# Patient Record
Sex: MALE | Race: WHITE | NOT HISPANIC OR LATINO | URBAN - METROPOLITAN AREA
[De-identification: names, ages, dates, MRNs, and addresses within clinical notes are randomized per-mention and may not be internally consistent; named-entity substitution may affect disease eponyms.]

---

## 2019-01-07 VITALS
TEMPERATURE: 98 F | SYSTOLIC BLOOD PRESSURE: 128 MMHG | OXYGEN SATURATION: 97 % | HEART RATE: 95 BPM | WEIGHT: 223.11 LBS | DIASTOLIC BLOOD PRESSURE: 78 MMHG | RESPIRATION RATE: 18 BRPM

## 2019-01-07 LAB — LACTATE SERPL-SCNC: 0.9 MMOL/L — SIGNIFICANT CHANGE UP (ref 0.5–2)

## 2019-01-07 RX ORDER — SODIUM CHLORIDE 9 MG/ML
1000 INJECTION INTRAMUSCULAR; INTRAVENOUS; SUBCUTANEOUS ONCE
Qty: 0 | Refills: 0 | Status: COMPLETED | OUTPATIENT
Start: 2019-01-07 | End: 2019-01-07

## 2019-01-07 RX ORDER — KETOROLAC TROMETHAMINE 30 MG/ML
15 SYRINGE (ML) INJECTION ONCE
Qty: 0 | Refills: 0 | Status: DISCONTINUED | OUTPATIENT
Start: 2019-01-07 | End: 2019-01-07

## 2019-01-07 RX ORDER — PIPERACILLIN AND TAZOBACTAM 4; .5 G/20ML; G/20ML
3.38 INJECTION, POWDER, LYOPHILIZED, FOR SOLUTION INTRAVENOUS ONCE
Qty: 0 | Refills: 0 | Status: COMPLETED | OUTPATIENT
Start: 2019-01-07 | End: 2019-01-07

## 2019-01-07 RX ORDER — MORPHINE SULFATE 50 MG/1
4 CAPSULE, EXTENDED RELEASE ORAL ONCE
Qty: 0 | Refills: 0 | Status: DISCONTINUED | OUTPATIENT
Start: 2019-01-07 | End: 2019-01-07

## 2019-01-07 RX ADMIN — PIPERACILLIN AND TAZOBACTAM 200 GRAM(S): 4; .5 INJECTION, POWDER, LYOPHILIZED, FOR SOLUTION INTRAVENOUS at 21:44

## 2019-01-07 RX ADMIN — Medication 15 MILLIGRAM(S): at 21:46

## 2019-01-07 RX ADMIN — SODIUM CHLORIDE 2000 MILLILITER(S): 9 INJECTION INTRAMUSCULAR; INTRAVENOUS; SUBCUTANEOUS at 21:45

## 2019-01-07 RX ADMIN — SODIUM CHLORIDE 2000 MILLILITER(S): 9 INJECTION INTRAMUSCULAR; INTRAVENOUS; SUBCUTANEOUS at 21:46

## 2019-01-07 RX ADMIN — MORPHINE SULFATE 4 MILLIGRAM(S): 50 CAPSULE, EXTENDED RELEASE ORAL at 22:04

## 2019-01-07 RX ADMIN — Medication 15 MILLIGRAM(S): at 22:04

## 2019-01-07 RX ADMIN — MORPHINE SULFATE 4 MILLIGRAM(S): 50 CAPSULE, EXTENDED RELEASE ORAL at 21:44

## 2019-01-07 NOTE — ED PROVIDER NOTE - MEDICAL DECISION MAKING DETAILS
+ severe diverticulitis, covered with abx, pain well controlled in ED, seen by surgical service, admitting for IV abx and possible surgical definitive management.

## 2019-01-07 NOTE — ED PROVIDER NOTE - CARE PLAN
Principal Discharge DX:	Diverticulitis of large intestine without perforation or abscess, unspecified bleeding status

## 2019-01-07 NOTE — ED PROVIDER NOTE - OBJECTIVE STATEMENT
47 y/o M w/hx IBS (no meds), hypothyroidism, low testosterone, non-smoker, p/w b/l lower abd pain (L more than R) for past 3 days, acutely worse today w/associated watery diarrhea, subjective fever/chills, nausea, no vomiting. No urinary sx. Felt different than prior IBS attacks (normally not painful and characterized by diarrhea only), went to UC, sent for outpt CT, called to ED today for + finding of perforated bowel. No hx diverticulitis. No hx abd surgery. No CP/SOB.

## 2019-01-07 NOTE — ED ADULT TRIAGE NOTE - CHIEF COMPLAINT QUOTE
" I have lower abdominal pain since Thursday and today I had a CT which showed I have a perforation to my bowels".

## 2019-01-07 NOTE — ED PROVIDER NOTE - PHYSICAL EXAMINATION
VITAL SIGNS: I have reviewed nursing notes and confirm.  CONSTITUTIONAL: Well-developed; well-nourished; in no acute distress.  SKIN: Agree with RN documentation regarding decubitus evaluation. Remainder of skin exam is warm and dry, no acute rash.  HEAD: Normocephalic; atraumatic.  EYES: PERRL, EOM intact; conjunctiva and sclera clear.  ENT: No nasal discharge; airway clear.  NECK: Supple; non tender.  CARD: S1, S2 normal; no murmurs, gallops, or rubs. Regular rate and rhythm.  RESP: No wheezes, rales or rhonchi.  ABD: Normal bowel sounds; soft; + TTP LLQ > RLQ w/guarding, no rigidity  : No CVA TTP or evidence of hernia  EXT: Normal ROM. No clubbing, cyanosis or edema.  LYMPH: No acute cervical adenopathy.  NEURO: Alert, oriented. Grossly unremarkable.  PSYCH: Cooperative, appropriate.

## 2019-01-08 ENCOUNTER — INPATIENT (INPATIENT)
Facility: HOSPITAL | Age: 49
LOS: 0 days | Discharge: HOME | DRG: 392 | End: 2019-01-09
Attending: STUDENT IN AN ORGANIZED HEALTH CARE EDUCATION/TRAINING PROGRAM | Admitting: STUDENT IN AN ORGANIZED HEALTH CARE EDUCATION/TRAINING PROGRAM
Payer: COMMERCIAL

## 2019-01-08 LAB
ANION GAP SERPL CALC-SCNC: 16 MMOL/L — SIGNIFICANT CHANGE UP (ref 5–17)
BUN SERPL-MCNC: 13 MG/DL — SIGNIFICANT CHANGE UP (ref 7–23)
CALCIUM SERPL-MCNC: 8.8 MG/DL — SIGNIFICANT CHANGE UP (ref 8.4–10.5)
CHLORIDE SERPL-SCNC: 102 MMOL/L — SIGNIFICANT CHANGE UP (ref 96–108)
CO2 SERPL-SCNC: 22 MMOL/L — SIGNIFICANT CHANGE UP (ref 22–31)
CREAT SERPL-MCNC: 1.07 MG/DL — SIGNIFICANT CHANGE UP (ref 0.5–1.3)
GLUCOSE SERPL-MCNC: 90 MG/DL — SIGNIFICANT CHANGE UP (ref 70–99)
HCT VFR BLD CALC: 42.5 % — SIGNIFICANT CHANGE UP (ref 39–50)
HGB BLD-MCNC: 14.4 G/DL — SIGNIFICANT CHANGE UP (ref 13–17)
MAGNESIUM SERPL-MCNC: 2.2 MG/DL — SIGNIFICANT CHANGE UP (ref 1.6–2.6)
MCHC RBC-ENTMCNC: 27.4 PG — SIGNIFICANT CHANGE UP (ref 27–34)
MCHC RBC-ENTMCNC: 33.9 G/DL — SIGNIFICANT CHANGE UP (ref 32–36)
MCV RBC AUTO: 80.8 FL — SIGNIFICANT CHANGE UP (ref 80–100)
PHOSPHATE SERPL-MCNC: 3.6 MG/DL — SIGNIFICANT CHANGE UP (ref 2.5–4.5)
PLATELET # BLD AUTO: 152 K/UL — SIGNIFICANT CHANGE UP (ref 150–400)
POTASSIUM SERPL-MCNC: 4.2 MMOL/L — SIGNIFICANT CHANGE UP (ref 3.5–5.3)
POTASSIUM SERPL-SCNC: 4.2 MMOL/L — SIGNIFICANT CHANGE UP (ref 3.5–5.3)
RBC # BLD: 5.26 M/UL — SIGNIFICANT CHANGE UP (ref 4.2–5.8)
RBC # FLD: 13 % — SIGNIFICANT CHANGE UP (ref 10.3–16.9)
SODIUM SERPL-SCNC: 140 MMOL/L — SIGNIFICANT CHANGE UP (ref 135–145)
WBC # BLD: 9.7 K/UL — SIGNIFICANT CHANGE UP (ref 3.8–10.5)
WBC # FLD AUTO: 9.7 K/UL — SIGNIFICANT CHANGE UP (ref 3.8–10.5)

## 2019-01-08 PROCEDURE — 99223 1ST HOSP IP/OBS HIGH 75: CPT | Mod: GC,57

## 2019-01-08 PROCEDURE — 99285 EMERGENCY DEPT VISIT HI MDM: CPT

## 2019-01-08 RX ORDER — ACETAMINOPHEN 500 MG
1000 TABLET ORAL ONCE
Qty: 0 | Refills: 0 | Status: COMPLETED | OUTPATIENT
Start: 2019-01-08 | End: 2019-01-08

## 2019-01-08 RX ORDER — ACETAMINOPHEN 500 MG
1000 TABLET ORAL ONCE
Qty: 0 | Refills: 0 | Status: DISCONTINUED | OUTPATIENT
Start: 2019-01-08 | End: 2019-01-08

## 2019-01-08 RX ORDER — LIOTHYRONINE SODIUM 25 UG/1
5 TABLET ORAL DAILY
Qty: 0 | Refills: 0 | Status: DISCONTINUED | OUTPATIENT
Start: 2019-01-08 | End: 2019-01-09

## 2019-01-08 RX ORDER — SODIUM CHLORIDE 9 MG/ML
1000 INJECTION, SOLUTION INTRAVENOUS
Qty: 0 | Refills: 0 | Status: DISCONTINUED | OUTPATIENT
Start: 2019-01-08 | End: 2019-01-09

## 2019-01-08 RX ORDER — LIOTHYRONINE SODIUM 25 UG/1
1 TABLET ORAL
Qty: 0 | Refills: 0 | COMMUNITY

## 2019-01-08 RX ORDER — HYDROMORPHONE HYDROCHLORIDE 2 MG/ML
0.5 INJECTION INTRAMUSCULAR; INTRAVENOUS; SUBCUTANEOUS ONCE
Qty: 0 | Refills: 0 | Status: DISCONTINUED | OUTPATIENT
Start: 2019-01-08 | End: 2019-01-08

## 2019-01-08 RX ORDER — PIPERACILLIN AND TAZOBACTAM 4; .5 G/20ML; G/20ML
3.38 INJECTION, POWDER, LYOPHILIZED, FOR SOLUTION INTRAVENOUS EVERY 6 HOURS
Qty: 0 | Refills: 0 | Status: DISCONTINUED | OUTPATIENT
Start: 2019-01-08 | End: 2019-01-09

## 2019-01-08 RX ORDER — KETOROLAC TROMETHAMINE 30 MG/ML
15 SYRINGE (ML) INJECTION EVERY 6 HOURS
Qty: 0 | Refills: 0 | Status: DISCONTINUED | OUTPATIENT
Start: 2019-01-08 | End: 2019-01-09

## 2019-01-08 RX ORDER — LEVOTHYROXINE SODIUM 125 MCG
50 TABLET ORAL
Qty: 0 | Refills: 0 | COMMUNITY

## 2019-01-08 RX ORDER — HEPARIN SODIUM 5000 [USP'U]/ML
5000 INJECTION INTRAVENOUS; SUBCUTANEOUS EVERY 8 HOURS
Qty: 0 | Refills: 0 | Status: DISCONTINUED | OUTPATIENT
Start: 2019-01-08 | End: 2019-01-09

## 2019-01-08 RX ADMIN — PIPERACILLIN AND TAZOBACTAM 200 GRAM(S): 4; .5 INJECTION, POWDER, LYOPHILIZED, FOR SOLUTION INTRAVENOUS at 09:38

## 2019-01-08 RX ADMIN — SODIUM CHLORIDE 140 MILLILITER(S): 9 INJECTION, SOLUTION INTRAVENOUS at 02:06

## 2019-01-08 RX ADMIN — HYDROMORPHONE HYDROCHLORIDE 0.5 MILLIGRAM(S): 2 INJECTION INTRAMUSCULAR; INTRAVENOUS; SUBCUTANEOUS at 11:22

## 2019-01-08 RX ADMIN — PIPERACILLIN AND TAZOBACTAM 200 GRAM(S): 4; .5 INJECTION, POWDER, LYOPHILIZED, FOR SOLUTION INTRAVENOUS at 21:28

## 2019-01-08 RX ADMIN — Medication 15 MILLIGRAM(S): at 17:25

## 2019-01-08 RX ADMIN — PIPERACILLIN AND TAZOBACTAM 200 GRAM(S): 4; .5 INJECTION, POWDER, LYOPHILIZED, FOR SOLUTION INTRAVENOUS at 14:44

## 2019-01-08 RX ADMIN — Medication 400 MILLIGRAM(S): at 07:20

## 2019-01-08 RX ADMIN — PIPERACILLIN AND TAZOBACTAM 200 GRAM(S): 4; .5 INJECTION, POWDER, LYOPHILIZED, FOR SOLUTION INTRAVENOUS at 02:48

## 2019-01-08 RX ADMIN — Medication 15 MILLIGRAM(S): at 17:10

## 2019-01-08 RX ADMIN — Medication 1000 MILLIGRAM(S): at 08:30

## 2019-01-08 RX ADMIN — SODIUM CHLORIDE 140 MILLILITER(S): 9 INJECTION, SOLUTION INTRAVENOUS at 09:39

## 2019-01-08 RX ADMIN — HYDROMORPHONE HYDROCHLORIDE 0.5 MILLIGRAM(S): 2 INJECTION INTRAMUSCULAR; INTRAVENOUS; SUBCUTANEOUS at 10:37

## 2019-01-08 NOTE — CONSULT NOTE ADULT - ASSESSMENT
49 y/o M with PMHx of chronic diarrhea/IBS, low testosterone, hypothyroidism admitted for diverticulitis and found to have microperforation and distal sigmoid fistula with concern for IBD.     - Afebrile, VSS, elevated WBC that has resolved, physical exam findings suggestive of   - Small fistula formation in the setting of diverticular inflammation less likely Crohn's  - Given presenting symptoms, diverticulitis would need to be treated prior to any further IBD workup. Suspicion is low given patient history of painless chronic diarrhea, lack of fevers, bloody diarrhea, extraintestinal manifestations, and family history.    - Travel history and history of E Histolytica could suggest a cause for diarrhea  - Continue current IV abx therapy for diverticulitis   - Will be set up with our practice for GI outpatient follow up     Discussed with attending and primary team. 49 y/o M with PMHx of chronic diarrhea/IBS, low testosterone, hypothyroidism admitted for diverticulitis and found to have microperforation and distal sigmoid fistula with concern for IBD.     - Afebrile, VSS, elevated WBC that has resolved  - Small fistula formation in the setting of diverticular inflammation less likely Crohn's disease  - Given presenting symptoms, diverticulitis would need to be treated prior to any further IBD workup. Suspicion is low given patient history of painless chronic diarrhea, lack of fevers, bloody diarrhea, extraintestinal manifestations, and family history, as well as neg cscope 5 years ago  - Travel history and history of E Histolytica could suggest a cause for diarrhea  - Continue current IV abx therapy for diverticulitis   -obtain outpt gi workup   - Pt should follow up with GI as outpatient for further workup. No acute GI interventions at this time. Please let us know if any further questions. Thank you    Discussed with attending and primary team. 47 y/o M with PMHx of chronic diarrhea/IBS, low testosterone, hypothyroidism admitted for diverticulitis and found to have microperforation and distal sigmoid fistula, with concern for IBD.     - Afebrile, VSS, elevated WBC that has resolved  - Small fistula formation in the setting of diverticular inflammation less likely Crohn's disease  - Given presenting symptoms, diverticulitis would need to be treated prior to any further IBD workup. Suspicion is low given patient history of painless chronic diarrhea, lack of fevers, bloody diarrhea, extraintestinal manifestations, and family history, as well as neg cscope (per pt) 5 years ago  - Travel history and history of E Histolytica could suggest a cause for diarrhea  - Continue current IV abx therapy for diverticulitis   -obtain outpt gi workup   - Pt should follow up with GI as outpatient for further workup. No acute GI interventions at this time. Please let us know if any further questions. Thank you    Discussed with attending and primary team.

## 2019-01-08 NOTE — H&P ADULT - ASSESSMENT
49 yo M with hypothyroid, IBS, low testosterone p/w acute microperforation of sigmoid diverticulitis    Plan :   Admit to , regional floor. Team 4  Pain and nausea control  NPO/IVF  IV.Zosyn  DVT prophylaxis   GI consult to evaluate possible IBD  Consult  in AM for colorectal pathology    Plan d/w  and chief 49 yo M with hypothyroid, IBS, low testosterone p/w acute microperforation of sigmoid diverticulitis    Plan :   Admit to , regional floor. Team 4  Pain and nausea control - no narcotics  Serial Q6hr abdominal exam and documented  NPO/IVF  IV.Zosyn  DVT prophylaxis   GI consult to evaluate possible IBD in AM  Consult  in AM for colorectal pathology    Plan d/w  and chief

## 2019-01-08 NOTE — H&P ADULT - NSHPPHYSICALEXAM_GEN_ALL_CORE
Vital Signs Last 24 Hrs  T(C): 36.9 (08 Jan 2019 00:12), Max: 37.3 (07 Jan 2019 21:55)  T(F): 98.4 (08 Jan 2019 00:12), Max: 99.2 (07 Jan 2019 21:55)  HR: 81 (08 Jan 2019 00:12) (81 - 95)  BP: 117/74 (08 Jan 2019 00:12) (117/74 - 145/78  RR: 18 (08 Jan 2019 00:12) (18 - 18)  SpO2: 95% (08 Jan 2019 00:12) (95% - 97%)      General: NAD, resting comfortably in bed  C/V: NSR  Pulm: Nonlabored breathing, no respiratory distress  Abd: soft, tender at lower part of the abdomen, not peritoneal, non distended, no guarding  Extrem: WWP, no edema.

## 2019-01-08 NOTE — H&P ADULT - NSHPLABSRESULTS_GEN_ALL_CORE
LABS:                        16.0   12.9  )-----------( 182      ( 2019 20:42 )             46.9         136  |  92<L>  |  13  ----------------------------<  89  4.0   |  25  |  1.06    Ca    9.6      2019 20:42    TPro  8.1  /  Alb  4.2  /  TBili  0.7  /  DBili  x   /  AST  18  /  ALT  14  /  AlkPhos  79  01-07    PT/INR - ( 2019 20:42 )   PT: 14.5 sec;   INR: 1.27          PTT - ( 2019 20:42 )  PTT:28.7 sec  Urinalysis Basic - ( 2019 22:30 )    Color: Yellow / Appearance: Clear / S.010 / pH: x  Gluc: x / Ketone: 40 mg/dL  / Bili: Negative / Urobili: 0.2 E.U./dL   Blood: x / Protein: NEGATIVE mg/dL / Nitrite: NEGATIVE   Leuk Esterase: NEGATIVE / RBC: x / WBC x   Sq Epi: x / Non Sq Epi: x / Bacteria: x        RADIOLOGY & ADDITIONAL STUDIES:

## 2019-01-08 NOTE — CONSULT NOTE ADULT - SUBJECTIVE AND OBJECTIVE BOX
Patient is a 48y old  Male who presents with a chief complaint of abdominal pain     HPI:  47 yo M with PMHx of low testosterone, hypothyroid and chronic diarrhea/IBS, no PSHx, who presents with 4 days of lower abdominal pain LLQ > RLQ. The pain began on Thursday and was described as cramping and in a band distribution across the lower abdomen, not related to food or position, and rated 8/10. The pain persisted and was accompanied with flu like symptoms over the weekend (weakness, chills, and fevers) that have since resolved. No associated N/V. The pain was initially intermittent however today, the pain started to worsen and he decided to go to urgent care for further eval. Patient was sent to [facility] radiology which CT scan was done and revealed microperforation of sigmoid diverticulitis and possible small intramural fistula within the distal sigmoid colon concerning for IBD and referred to the ED for further management  Patient does not have a GI doctor.     Patient has had EGD/colonscopy 5 years ago for evaluation of painless chronic diarrhea that as per the patient showed general non specific inflammation. He has never been told he suffers from IBD and has not taken any chronic medications for his diarrhea. 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 After a short trial of abcx the patient developed rashes and treatment was cut short. This did not relieve his symptoms. His Last BM was today and is passing gas. Patient reports an extensive travel history and denies any sick contacts, pets, smoking, alcohol intake, family history of IBD. Denies any rashes, joint pain, and changes in vision.      REVIEW OF SYSTEMS:  Constitutional: No weight loss or fatigue  ENMT:  No difficulty hearing, tinnitus, vertigo; No sinus or throat pain  Respiratory: No cough, wheezing, chills or hemoptysis  Cardiovascular: No chest pain, palpitations, dizziness or leg swelling  Gastrointestinal: No abdominal or epigastric pain. No nausea, vomiting or hematemesis; No diarrhea or constipation. No melena or hematochezia.  Skin: No itching, burning, rashes or lesions   Musculoskeletal: No joint pain or swelling; No muscle, back or extremity pain    PAST MEDICAL & SURGICAL HISTORY:      FAMILY HISTORY:  No pertinent FH     SOCIAL HISTORY:  Smoking Status: [ ] Current, [ ] Former, [X] Never  Pack Years:    MEDICATIONS:  MEDICATIONS  (STANDING):  heparin  Injectable 5000 Unit(s) SubCutaneous every 8 hours  lactated ringers. 1000 milliLiter(s) (140 mL/Hr) IV Continuous <Continuous>  piperacillin/tazobactam IVPB. 3.375 Gram(s) IV Intermittent every 6 hours    MEDICATIONS  (PRN):      Allergies    Gluten (Unknown)  lactose (Unknown)  No Known Drug Allergies    Intolerances        Vital Signs Last 24 Hrs  T(C): 36.7 (08 Jan 2019 08:59), Max: 37.3 (07 Jan 2019 21:55)  T(F): 98 (08 Jan 2019 08:59), Max: 99.2 (07 Jan 2019 21:55)  HR: 80 (08 Jan 2019 08:59) (80 - 95)  BP: 123/79 (08 Jan 2019 08:59) (117/74 - 145/78)  BP(mean): --  RR: 18 (08 Jan 2019 08:59) (18 - 18)  SpO2: 96% (08 Jan 2019 08:59) (95% - 97%)        PHYSICAL EXAM:    General: Well developed; well nourished; in no acute distress  HEENT: MMM, conjunctiva and sclera clear  Gastrointestinal: Soft, tender to deep palpation in the LLQ, distended; Normal bowel sounds; No rebound or guarding  Extremities: Normal range of motion, No clubbing, cyanosis or edema  Neurological: Alert and oriented x3  Skin: Warm and dry. No obvious rash      LABS:                        14.4   9.7   )-----------( 152      ( 08 Jan 2019 06:58 )             42.5     01-08    140  |  102  |  13  ----------------------------<  90  4.2   |  22  |  1.07    Ca    8.8      08 Jan 2019 06:58  Phos  3.6     01-08  Mg     2.2     01-08    TPro  8.1  /  Alb  4.2  /  TBili  0.7  /  DBili  x   /  AST  18  /  ALT  14  /  AlkPhos  79  01-07          RADIOLOGY & ADDITIONAL STUDIES: Patient is a 48y old  Male who presents with a chief complaint of abdominal pain     HPI:  47 yo M with PMHx of low testosterone, hypothyroid and chronic diarrhea/IBS, no PSHx, who presents with 4 days of lower abdominal pain LLQ > RLQ. The pain began on Thursday and was described as cramping and in a band distribution across the lower abdomen, not related to food or position, and rated 8/10. The pain persisted and was accompanied with flu like symptoms over the weekend (weakness, chills, and fevers) that have since resolved. No associated N/V. The pain was initially intermittent however today, the pain started to worsen and he decided to go to urgent care for further eval. Patient was sent to [facility] radiology which CT scan was done and revealed microperforation of sigmoid diverticulitis and possible small intramural fistula within the distal sigmoid colon concerning for IBD and referred to the ED for further management  Patient does not have a GI doctor.     Patient has had EGD/colonscopy 5 years ago for evaluation of painless chronic diarrhea that as per the patient showed general non specific inflammation. He has never been told he suffers from IBD and has not taken any chronic medications for his diarrhea. 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 After a short trial of abcx the patient developed rashes and treatment was cut short. This did not relieve his symptoms. His Last BM was today and is passing gas. Patient reports an extensive travel history and denies any sick contacts, pets, smoking, alcohol intake, family history of IBD. Denies any rashes, joint pain, and changes in vision.      REVIEW OF SYSTEMS:  Constitutional: No weight loss or fatigue  ENMT:  No difficulty hearing, tinnitus, vertigo; No sinus or throat pain  Respiratory: No cough, wheezing, chills or hemoptysis  Cardiovascular: No chest pain, palpitations, dizziness or leg swelling  Gastrointestinal: + abd pain, No nausea, vomiting or hematemesis; No diarrhea or constipation. No melena or hematochezia.  Skin: No itching, burning, rashes or lesions   Musculoskeletal: No joint pain or swelling; No muscle, back or extremity pain    PAST MEDICAL & SURGICAL HISTORY:      FAMILY HISTORY:  No pertinent GI FH    SOCIAL HISTORY:  Smoking Status: [ ] Current, [ ] Former, [X] Never  Pack Years:  denies alcohol, drug use    MEDICATIONS:  MEDICATIONS  (STANDING):  heparin  Injectable 5000 Unit(s) SubCutaneous every 8 hours  lactated ringers. 1000 milliLiter(s) (140 mL/Hr) IV Continuous <Continuous>  piperacillin/tazobactam IVPB. 3.375 Gram(s) IV Intermittent every 6 hours    MEDICATIONS  (PRN):      Allergies    Gluten (Unknown)  lactose (Unknown)  No Known Drug Allergies    Intolerances        Vital Signs Last 24 Hrs  T(C): 36.7 (08 Jan 2019 08:59), Max: 37.3 (07 Jan 2019 21:55)  T(F): 98 (08 Jan 2019 08:59), Max: 99.2 (07 Jan 2019 21:55)  HR: 80 (08 Jan 2019 08:59) (80 - 95)  BP: 123/79 (08 Jan 2019 08:59) (117/74 - 145/78)  BP(mean): --  RR: 18 (08 Jan 2019 08:59) (18 - 18)  SpO2: 96% (08 Jan 2019 08:59) (95% - 97%)        PHYSICAL EXAM:    General: Well developed; well nourished; in no acute distress  HEENT: MMM, conjunctiva and sclera clear  Gastrointestinal: Soft, tender to deep palpation in the LLQ, distended; Normal bowel sounds; No rebound or guarding  Extremities: Normal range of motion, No clubbing, cyanosis or edema  Neurological: Alert and oriented x3  Skin: Warm and dry. No obvious rash      LABS:                        14.4   9.7   )-----------( 152      ( 08 Jan 2019 06:58 )             42.5     01-08    140  |  102  |  13  ----------------------------<  90  4.2   |  22  |  1.07    Ca    8.8      08 Jan 2019 06:58  Phos  3.6     01-08  Mg     2.2     01-08    TPro  8.1  /  Alb  4.2  /  TBili  0.7  /  DBili  x   /  AST  18  /  ALT  14  /  AlkPhos  79  01-07          RADIOLOGY & ADDITIONAL STUDIES:

## 2019-01-08 NOTE — CONSULT NOTE ADULT - ATTENDING COMMENTS
Recommendations as above. Management of sigmoid diverticulitis per primary team.   F/u with Dr Erwin as outpatient for w/u chronic diarrhea, r/o parasitic infestation and IBD; presentation and baseline symptoms are not entirely consistent with the latter.

## 2019-01-08 NOTE — H&P ADULT - HISTORY OF PRESENT ILLNESS
This is a 47 yo M with PMHx of low testosterone, hypothyroid and IBS, no PSHx, colonoscopy 5 years ago and revealed colitis. He presented with 4 days of abdominal pain, located at lower abdomen with LLQ > RLQ. Has some chills and feverish feeling 2 days ago. No nausea/vomiting. The pain was initially intermittent however today, the pain started to worsen and he decided to go to  for further eval. Patient was sent to [facility] radiology which CT scan was done and revealed microperforation of sigmoid diverticulitis and possible small intramural fistula within the distal sigmoid colon concerning for IBD. He was sent here for further management. This is a 47 yo M with PMHx of low testosterone, hypothyroid and IBS, no PSHx, colonoscopy 5 years ago and revealed colitis. He presented with 4 days of abdominal pain, located at lower abdomen with LLQ > RLQ. Has some chills and feverish feeling 2 days ago. No nausea/vomiting. The pain was initially intermittent however today, the pain started to worsen and he decided to go to  for further eval. Patient was sent to [facility] radiology which CT scan was done and revealed microperforation of sigmoid diverticulitis and possible small intramural fistula within the distal sigmoid colon concerning for IBD. He was sent here for further management.   Last BM was today, passing gas. No GI doctor currently.

## 2019-01-08 NOTE — PATIENT PROFILE ADULT - NSASFUNCLEVELADLTRANSFER_GEN_A_NUR
0 = independent Muscle Hinge Flap Text: The defect edges were debeveled with a #15 scalpel blade.  Given the size, depth and location of the defect and the proximity to free margins a muscle hinge flap was deemed most appropriate.  Using a sterile surgical marker, an appropriate hinge flap was drawn incorporating the defect. The area thus outlined was incised with a #15 scalpel blade.  The skin margins were undermined to an appropriate distance in all directions utilizing iris scissors.

## 2019-01-09 ENCOUNTER — TRANSCRIPTION ENCOUNTER (OUTPATIENT)
Age: 49
End: 2019-01-09

## 2019-01-09 VITALS
TEMPERATURE: 98 F | OXYGEN SATURATION: 96 % | DIASTOLIC BLOOD PRESSURE: 80 MMHG | RESPIRATION RATE: 15 BRPM | HEART RATE: 78 BPM | SYSTOLIC BLOOD PRESSURE: 126 MMHG

## 2019-01-09 LAB
ANION GAP SERPL CALC-SCNC: 12 MMOL/L — SIGNIFICANT CHANGE UP (ref 5–17)
BUN SERPL-MCNC: 9 MG/DL — SIGNIFICANT CHANGE UP (ref 7–23)
CALCIUM SERPL-MCNC: 8.8 MG/DL — SIGNIFICANT CHANGE UP (ref 8.4–10.5)
CHLORIDE SERPL-SCNC: 101 MMOL/L — SIGNIFICANT CHANGE UP (ref 96–108)
CO2 SERPL-SCNC: 24 MMOL/L — SIGNIFICANT CHANGE UP (ref 22–31)
CREAT SERPL-MCNC: 1.02 MG/DL — SIGNIFICANT CHANGE UP (ref 0.5–1.3)
GLUCOSE SERPL-MCNC: 76 MG/DL — SIGNIFICANT CHANGE UP (ref 70–99)
HCT VFR BLD CALC: 42.5 % — SIGNIFICANT CHANGE UP (ref 39–50)
HGB BLD-MCNC: 14.7 G/DL — SIGNIFICANT CHANGE UP (ref 13–17)
MAGNESIUM SERPL-MCNC: 1.7 MG/DL — SIGNIFICANT CHANGE UP (ref 1.6–2.6)
MCHC RBC-ENTMCNC: 27.8 PG — SIGNIFICANT CHANGE UP (ref 27–34)
MCHC RBC-ENTMCNC: 34.6 G/DL — SIGNIFICANT CHANGE UP (ref 32–36)
MCV RBC AUTO: 80.5 FL — SIGNIFICANT CHANGE UP (ref 80–100)
PHOSPHATE SERPL-MCNC: 3.2 MG/DL — SIGNIFICANT CHANGE UP (ref 2.5–4.5)
PLATELET # BLD AUTO: 170 K/UL — SIGNIFICANT CHANGE UP (ref 150–400)
POTASSIUM SERPL-MCNC: 3.9 MMOL/L — SIGNIFICANT CHANGE UP (ref 3.5–5.3)
POTASSIUM SERPL-SCNC: 3.9 MMOL/L — SIGNIFICANT CHANGE UP (ref 3.5–5.3)
RBC # BLD: 5.28 M/UL — SIGNIFICANT CHANGE UP (ref 4.2–5.8)
RBC # FLD: 12.7 % — SIGNIFICANT CHANGE UP (ref 10.3–16.9)
SODIUM SERPL-SCNC: 137 MMOL/L — SIGNIFICANT CHANGE UP (ref 135–145)
WBC # BLD: 7.3 K/UL — SIGNIFICANT CHANGE UP (ref 3.8–10.5)
WBC # FLD AUTO: 7.3 K/UL — SIGNIFICANT CHANGE UP (ref 3.8–10.5)

## 2019-01-09 PROCEDURE — 99285 EMERGENCY DEPT VISIT HI MDM: CPT | Mod: 25

## 2019-01-09 PROCEDURE — 83690 ASSAY OF LIPASE: CPT

## 2019-01-09 PROCEDURE — 81003 URINALYSIS AUTO W/O SCOPE: CPT

## 2019-01-09 PROCEDURE — 84100 ASSAY OF PHOSPHORUS: CPT

## 2019-01-09 PROCEDURE — 80053 COMPREHEN METABOLIC PANEL: CPT

## 2019-01-09 PROCEDURE — 85027 COMPLETE CBC AUTOMATED: CPT

## 2019-01-09 PROCEDURE — 87040 BLOOD CULTURE FOR BACTERIA: CPT

## 2019-01-09 PROCEDURE — 83605 ASSAY OF LACTIC ACID: CPT

## 2019-01-09 PROCEDURE — 87086 URINE CULTURE/COLONY COUNT: CPT

## 2019-01-09 PROCEDURE — 80048 BASIC METABOLIC PNL TOTAL CA: CPT

## 2019-01-09 PROCEDURE — 96374 THER/PROPH/DIAG INJ IV PUSH: CPT

## 2019-01-09 PROCEDURE — 85025 COMPLETE CBC W/AUTO DIFF WBC: CPT

## 2019-01-09 PROCEDURE — 83735 ASSAY OF MAGNESIUM: CPT

## 2019-01-09 PROCEDURE — 85730 THROMBOPLASTIN TIME PARTIAL: CPT

## 2019-01-09 PROCEDURE — 96375 TX/PRO/DX INJ NEW DRUG ADDON: CPT

## 2019-01-09 PROCEDURE — 36415 COLL VENOUS BLD VENIPUNCTURE: CPT

## 2019-01-09 PROCEDURE — 85610 PROTHROMBIN TIME: CPT

## 2019-01-09 RX ADMIN — Medication 15 MILLIGRAM(S): at 00:03

## 2019-01-09 RX ADMIN — Medication 15 MILLIGRAM(S): at 05:54

## 2019-01-09 RX ADMIN — Medication 15 MILLIGRAM(S): at 00:18

## 2019-01-09 RX ADMIN — PIPERACILLIN AND TAZOBACTAM 200 GRAM(S): 4; .5 INJECTION, POWDER, LYOPHILIZED, FOR SOLUTION INTRAVENOUS at 09:16

## 2019-01-09 RX ADMIN — PIPERACILLIN AND TAZOBACTAM 200 GRAM(S): 4; .5 INJECTION, POWDER, LYOPHILIZED, FOR SOLUTION INTRAVENOUS at 03:24

## 2019-01-09 RX ADMIN — LIOTHYRONINE SODIUM 5 MICROGRAM(S): 25 TABLET ORAL at 05:54

## 2019-01-09 NOTE — DISCHARGE NOTE ADULT - CARE PROVIDERS DIRECT ADDRESSES
[email or web address]_Unknown,DirectAddress_Unknown

## 2019-01-09 NOTE — DISCHARGE NOTE ADULT - CARE PROVIDER_API CALL
Lna Erwin), Gastroenterology; Internal Medicine  [address] [place] [address]  Phone: [phone]  Fax: [phone]    Mary Kay Green), ColonRectal Surgery; Surgery  [address] [place] [address]  Phone: [phone]  Fax: [phone]    Casie Jacobs (), Internal Medicine; Preventive Medicine  [address] [place] [address]  Phone: [phone]  Fax: [phone]

## 2019-01-09 NOTE — DISCHARGE NOTE ADULT - PATIENT PORTAL LINK FT
You can access the [facility] Portal, offered by [facility], by registering with the following website: [email or web address]

## 2019-01-09 NOTE — PROGRESS NOTE ADULT - ASSESSMENT
47 yo M with hypothyroid, IBS, low testosterone p/w acute microperforation of sigmoid diverticulitis    Pain and nausea control  CLD/IVF  IV.Zosyn  DVT prophylaxis   Dispo: Home on clears, with abx

## 2019-01-09 NOTE — DISCHARGE NOTE ADULT - PLAN OF CARE
Recovery Please resume all regular home medications unless specifically advised not to take a particular medication.   Please take antibiotics as prescribed.  Please get plenty of rest, continue to ambulate several times per day, and drink adequate amounts of fluids.   Avoid lifting weights greater than 5-10 lbs until you follow-up with your surgeon, who will instruct you further regarding activity restrictions. Follow up Please follow up with Dr. Erwin from GI.  Please follow up with Dr. Green from colorectal surgery. Medications Please take augmentin as prescribed for 5 more day.s Please take augmentin as prescribed for 7 more day.s

## 2019-01-09 NOTE — DISCHARGE NOTE ADULT - MEDICATION SUMMARY - MEDICATIONS TO TAKE
I will START or STAY ON the medications listed below when I get home from the hospital:    amoxicillin-clavulanate 875 mg-125 mg oral tablet  -- 1 tab(s) by mouth 2 times a day   -- Finish all this medication unless otherwise directed by prescriber.  Take with food or milk.    -- Indication: For antibiotics    Testosterone Cypionate 200 mg/mL intramuscular solution  -- 200 milligram(s) intramuscular 5 times a week  -- Indication: For home medication    liothyronine 5 mcg oral tablet  -- 1 tab(s) by mouth 2 times a day  -- Indication: For home medication    Tirosint  -- 50 microgram(s) by mouth once a day  -- Indication: For home medication

## 2019-01-09 NOTE — DISCHARGE NOTE ADULT - HOSPITAL COURSE
This is a 49 yo M with PMHx of low testosterone, hypothyroid and IBS, no PSHx, colonoscopy 5 years ago and revealed colitis. He presented with 4 days of abdominal pain, located at lower abdomen with LLQ > RLQ. Has some chills and feverish feeling 2 days ago. No nausea/vomiting. The pain was initially intermittent however today, the pain started to worsen and he decided to go to  for further eval. Patient was sent to [facility] radiology which CT scan was done and revealed microperforation of sigmoid diverticulitis and possible small intramural fistula within the distal sigmoid colon concerning for IBD.  On hospital day 1 the patient was evaluated by GI who recommended continued antibiotics and outpatient follow up for IBD work up. On hospital day 2 the patient was advanced to clear liquid diet which was tolerated. This is a 49 yo M with PMHx of low testosterone, hypothyroid and IBS, no PSHx, colonoscopy 5 years ago and revealed colitis. He presented with 4 days of abdominal pain, located at lower abdomen with LLQ > RLQ. Has some chills and feverish feeling 2 days ago. No nausea/vomiting. The pain was initially intermittent however today, the pain started to worsen and he decided to go to  for further eval. Patient was sent to [facility] radiology which CT scan was done and revealed microperforation of sigmoid diverticulitis and possible small intramural fistula within the distal sigmoid colon concerning for IBD.  On hospital day 1 the patient was evaluated by GI who recommended continued antibiotics and outpatient follow up for IBD work up. On hospital day 2 the patient was advanced to clear liquid diet which was tolerated. The patient is being discharge to home on CLD and is to advanced diet to low fiber at home in 2 days.

## 2019-01-09 NOTE — PROGRESS NOTE ADULT - SUBJECTIVE AND OBJECTIVE BOX
24 hr events:  O/N: JOCELYNN, AVSS  : Added Toradol; GI recs no intervention, IR no intervention    SUBJECTIVE:  Reports that his pain has resolved. Denies nausea/vomiting. +F/+BM.    Vital Signs Last 24 Hrs  T(C): 36.7 (2019 09:30), Max: 36.7 (2019 20:22)  T(F): 98.1 (2019 09:30), Max: 98.1 (2019 20:22)  HR: 78 (2019 09:30) (69 - 80)  BP: 126/80 (2019 09:30) (117/73 - 128/73)  BP(mean): --  RR: 15 (2019 09:30) (15 - 18)  SpO2: 96% (2019 09:30) (95% - 97%)    Physical Exam:  General: NAD  Pulmonary: Nonlabored breathing, no respiratory distress  Abdominal: soft, NT/ND  Neuro: A/O x3    I&O's Summary  2019 07:01  -  2019 07:00  --------------------------------------------------------  IN: 1080 mL / OUT: 0 mL / NET: 1080 mL    2019 07:01  -  2019 12:47  --------------------------------------------------------  IN: 0 mL / OUT: 1050 mL / NET: -1050 mL    LABS:                   14.7   7.3   )-----------( 170      ( 2019 06:56 )             42.5       137  |  101  |  9   ----------------------------<  76  3.9   |  24  |  1.02    Ca    8.8      2019 06:56  Phos  3.2     -09  Mg     1.7     01-09    TPro  8.1  /  Alb  4.2  /  TBili  0.7  /  DBili  x   /  AST  18  /  ALT  14  /  AlkPhos  79  -    PT/INR - ( 2019 20:42 )   PT: 14.5 sec;   INR: 1.27     PTT - ( 2019 20:42 )  PTT:28.7 sec    Urinalysis Basic - ( 2019 22:30 )  Color: Yellow / Appearance: Clear / S.010 / pH: x  Gluc: x / Ketone: 40 mg/dL  / Bili: Negative / Urobili: 0.2 E.U./dL   Blood: x / Protein: NEGATIVE mg/dL / Nitrite: NEGATIVE   Leuk Esterase: NEGATIVE / RBC: x / WBC x   Sq Epi: x / Non Sq Epi: x / Bacteria: x    LIVER FUNCTIONS - ( 2019 20:42 )  Alb: 4.2 g/dL / Pro: 8.1 g/dL / ALK PHOS: 79 U/L / ALT: 14 U/L / AST: 18 U/L / GGT: x

## 2019-01-09 NOTE — DISCHARGE NOTE ADULT - CARE PLAN
Principal Discharge DX:	Diverticulitis of large intestine without perforation or abscess, unspecified bleeding status  Goal:	Recovery  Assessment and plan of treatment:	Please resume all regular home medications unless specifically advised not to take a particular medication.   Please take antibiotics as prescribed.  Please get plenty of rest, continue to ambulate several times per day, and drink adequate amounts of fluids.   Avoid lifting weights greater than 5-10 lbs until you follow-up with your surgeon, who will instruct you further regarding activity restrictions.  Goal:	Follow up  Assessment and plan of treatment:	Please follow up with Dr. Erwin from GI.  Please follow up with Dr. Green from colorectal surgery.  Goal:	Medications  Assessment and plan of treatment:	Please take augmentin as prescribed for 5 more day.s Principal Discharge DX:	Diverticulitis of large intestine without perforation or abscess, unspecified bleeding status  Goal:	Recovery  Assessment and plan of treatment:	Please resume all regular home medications unless specifically advised not to take a particular medication.   Please take antibiotics as prescribed.  Please get plenty of rest, continue to ambulate several times per day, and drink adequate amounts of fluids.   Avoid lifting weights greater than 5-10 lbs until you follow-up with your surgeon, who will instruct you further regarding activity restrictions.  Goal:	Follow up  Assessment and plan of treatment:	Please follow up with Dr. Erwin from GI.  Please follow up with Dr. Green from colorectal surgery.  Goal:	Medications  Assessment and plan of treatment:	Please take augmentin as prescribed for 7 more day.s

## 2019-01-11 DIAGNOSIS — K57.20 DIVERTICULITIS OF LARGE INTESTINE WITH PERFORATION AND ABSCESS WITHOUT BLEEDING: ICD-10-CM

## 2019-01-11 DIAGNOSIS — Z91.011 ALLERGY TO MILK PRODUCTS: ICD-10-CM

## 2019-01-11 DIAGNOSIS — K90.41 NON-CELIAC GLUTEN SENSITIVITY: ICD-10-CM

## 2019-01-11 DIAGNOSIS — E03.9 HYPOTHYROIDISM, UNSPECIFIED: ICD-10-CM

## 2019-01-11 DIAGNOSIS — E29.1 TESTICULAR HYPOFUNCTION: ICD-10-CM

## 2019-01-11 DIAGNOSIS — K58.9 IRRITABLE BOWEL SYNDROME WITHOUT DIARRHEA: ICD-10-CM

## 2019-01-13 LAB
CULTURE RESULTS: SIGNIFICANT CHANGE UP
CULTURE RESULTS: SIGNIFICANT CHANGE UP
SPECIMEN SOURCE: SIGNIFICANT CHANGE UP
SPECIMEN SOURCE: SIGNIFICANT CHANGE UP

## 2019-01-17 NOTE — CONSULT NOTE ADULT - CONSULT REASON
----- Message from Delmis Bernal DO sent at 1/17/2019 12:27 PM EST -----  Lab work for hypercoagulable state was negative    Tell him to follow up with his ophthalmologist  eval for IBD

## 2019-01-22 PROBLEM — Z00.00 ENCOUNTER FOR PREVENTIVE HEALTH EXAMINATION: Status: ACTIVE | Noted: 2019-01-22

## 2019-01-24 ENCOUNTER — APPOINTMENT (OUTPATIENT)
Dept: GASTROENTEROLOGY | Facility: CLINIC | Age: 49
End: 2019-01-24
Payer: COMMERCIAL

## 2019-01-24 ENCOUNTER — TRANSCRIPTION ENCOUNTER (OUTPATIENT)
Age: 49
End: 2019-01-24

## 2019-01-24 VITALS
DIASTOLIC BLOOD PRESSURE: 70 MMHG | HEART RATE: 99 BPM | BODY MASS INDEX: 29.68 KG/M2 | HEIGHT: 71 IN | SYSTOLIC BLOOD PRESSURE: 130 MMHG | RESPIRATION RATE: 14 BRPM | OXYGEN SATURATION: 97 % | WEIGHT: 212 LBS

## 2019-01-24 DIAGNOSIS — K57.32 DIVERTICULITIS OF LARGE INTESTINE W/OUT PERFORATION OR ABSCESS W/OUT BLEEDING: ICD-10-CM

## 2019-01-24 DIAGNOSIS — R10.9 UNSPECIFIED ABDOMINAL PAIN: ICD-10-CM

## 2019-01-24 DIAGNOSIS — Z86.39 PERSONAL HISTORY OF OTHER ENDOCRINE, NUTRITIONAL AND METABOLIC DISEASE: ICD-10-CM

## 2019-01-24 DIAGNOSIS — Z87.19 PERSONAL HISTORY OF OTHER DISEASES OF THE DIGESTIVE SYSTEM: ICD-10-CM

## 2019-01-24 PROCEDURE — 99245 OFF/OP CONSLTJ NEW/EST HI 55: CPT | Mod: 25

## 2019-01-24 PROCEDURE — 36415 COLL VENOUS BLD VENIPUNCTURE: CPT

## 2019-01-24 RX ORDER — LIOTHYRONINE SODIUM 5 UG/1
TABLET ORAL
Refills: 0 | Status: ACTIVE | COMMUNITY

## 2019-01-24 RX ORDER — LEVOTHYROXINE SODIUM 50 UG/1
50 CAPSULE ORAL
Refills: 0 | Status: ACTIVE | COMMUNITY

## 2019-01-24 RX ORDER — TESTOSTERONE 200 MG
200 PELLET (EA) IMPLANTATION
Refills: 0 | Status: ACTIVE | COMMUNITY

## 2019-01-24 RX ORDER — CHROMIUM 200 MCG
TABLET ORAL
Refills: 0 | Status: ACTIVE | COMMUNITY

## 2019-01-24 RX ORDER — PSYLLIUM HUSK 0.4 G
CAPSULE ORAL
Refills: 0 | Status: ACTIVE | COMMUNITY

## 2019-01-25 PROBLEM — K57.32 DIVERTICULITIS OF COLON: Status: ACTIVE | Noted: 2019-01-24

## 2019-01-25 PROBLEM — R10.9 ABDOMINAL PAIN: Status: ACTIVE | Noted: 2019-01-25

## 2019-01-25 LAB
FERRITIN SERPL-MCNC: 174 NG/ML
IGA SER QL IEP: 359 MG/DL
IRON SATN MFR SERPL: 24 %
IRON SERPL-MCNC: 74 UG/DL
TIBC SERPL-MCNC: 307 UG/DL
TRANSFERRIN SERPL-MCNC: 213 MG/DL
TTG IGA SER IA-ACNC: <5 UNITS
TTG IGA SER-ACNC: NEGATIVE
TTG IGG SER IA-ACNC: <5 UNITS
TTG IGG SER IA-ACNC: NEGATIVE
UIBC SERPL-MCNC: 233 UG/DL

## 2019-01-25 NOTE — ASSESSMENT
[FreeTextEntry1] : 48 Y M, hx chronic fatigue and chronic pain (right side of body), plantar fasciitis, hypothyroidism, IBS-D, presents today post-ED visit for sigmoid abscess in setting of colonic inflammation from diverticulitis. Symptoms improved but not resolved on Augmentin x 1 week. Given read of intramural fistula, he was referred to our clinic to rule out Crohn's Disease. No EIMs or family history of CRC or IBD.\par \par Diverticulitis\par - given pain is not resolved (prior tx with augmentin only), will start levaquin/flagyl x 10 days\par - if symptoms do not resolve, advised he inform our office to consider further imaging studies\par - plan for cscope in 6 weeks to evaluate area and rule out other pathology\par - low fiber diet for now, once acute symptoms resolve, advised high fiber and constipation avoidance\par \par IBS-D?\par - will need further evaluation; consider constipation overload given some solid stools; consider stopping iron after Fe panel checked and adding fiber supplement citrucel\par - evaluate if he is symptom free after colon prep\par - redraw ttg/igA to r/o Celiac (he's on gluten free diet, so understands this maybe a false negative)\par \par F/U post-procedure with NP

## 2019-01-25 NOTE — CONSULT LETTER
[Dear  ___] : Dear  [unfilled], [Consult Letter:] : I had the pleasure of evaluating your patient, [unfilled]. [Please see my note below.] : Please see my note below. [Consult Closing:] : Thank you very much for allowing me to participate in the care of this patient.  If you have any questions, please do not hesitate to contact me. [Sincerely,] : Sincerely, [FreeTextEntry3] : Lan Erwin MD\par Director, IBD Program\par [facility] [place] [facility], [facility]\par \par Associate Professor of Medicine\par [employer]\par [employer] [facility] [employer] [facility]\par

## 2019-01-25 NOTE — PHYSICAL EXAM
[General Appearance - Alert] : alert [General Appearance - In No Acute Distress] : in no acute distress [Sclera] : the sclera and conjunctiva were normal [Outer Ear] : the ears and nose were normal in appearance [Neck Appearance] : the appearance of the neck was normal [] : no respiratory distress [Bowel Sounds] : normal bowel sounds [Abdomen Soft] : soft [Abnormal Walk] : normal gait [Skin Color & Pigmentation] : normal skin color and pigmentation [No Focal Deficits] : no focal deficits [Oriented To Time, Place, And Person] : oriented to person, place, and time [FreeTextEntry1] : LLQ tenderness

## 2019-02-19 ENCOUNTER — APPOINTMENT (OUTPATIENT)
Dept: GASTROENTEROLOGY | Facility: CLINIC | Age: 49
End: 2019-02-19

## 2019-03-14 ENCOUNTER — APPOINTMENT (OUTPATIENT)
Dept: GASTROENTEROLOGY | Facility: HOSPITAL | Age: 49
End: 2019-03-14

## 2019-03-14 ENCOUNTER — RESULT REVIEW (OUTPATIENT)
Age: 49
End: 2019-03-14

## 2019-03-14 ENCOUNTER — OUTPATIENT (OUTPATIENT)
Dept: OUTPATIENT SERVICES | Facility: HOSPITAL | Age: 49
LOS: 1 days | Discharge: HOME | End: 2019-03-14
Payer: COMMERCIAL

## 2019-03-14 PROCEDURE — 45380 COLONOSCOPY AND BIOPSY: CPT

## 2019-03-14 PROCEDURE — 88305 TISSUE EXAM BY PATHOLOGIST: CPT

## 2019-03-17 LAB — SURGICAL PATHOLOGY STUDY: SIGNIFICANT CHANGE UP

## 2019-03-19 ENCOUNTER — APPOINTMENT (OUTPATIENT)
Dept: GASTROENTEROLOGY | Facility: CLINIC | Age: 49
End: 2019-03-19
Payer: COMMERCIAL

## 2019-03-19 DIAGNOSIS — R19.7 DIARRHEA, UNSPECIFIED: ICD-10-CM

## 2019-03-19 DIAGNOSIS — K58.0 IRRITABLE BOWEL SYNDROME WITH DIARRHEA: ICD-10-CM

## 2019-03-19 PROCEDURE — 99213 OFFICE O/P EST LOW 20 MIN: CPT

## 2019-03-19 RX ORDER — IRON/IRON ASP GLY/FA/MV-MIN 38 125-25-1MG
TABLET ORAL
Refills: 0 | Status: DISCONTINUED | COMMUNITY
End: 2019-03-19

## 2019-03-19 RX ORDER — METRONIDAZOLE 500 MG/1
500 TABLET ORAL 3 TIMES DAILY
Qty: 30 | Refills: 0 | Status: DISCONTINUED | COMMUNITY
Start: 2019-01-24 | End: 2019-03-19

## 2019-03-19 RX ORDER — PAROMOMYCIN SULFATE 250 MG/1
250 CAPSULE ORAL
Qty: 30 | Refills: 0 | Status: DISCONTINUED | COMMUNITY
Start: 2018-10-09

## 2019-03-19 RX ORDER — LEVOFLOXACIN 750 MG/1
750 TABLET, FILM COATED ORAL DAILY
Qty: 10 | Refills: 0 | Status: DISCONTINUED | COMMUNITY
Start: 2019-01-24 | End: 2019-03-19

## 2019-03-19 RX ORDER — TINIDAZOLE 500 MG/1
500 TABLET, FILM COATED ORAL
Qty: 12 | Refills: 0 | Status: DISCONTINUED | COMMUNITY
Start: 2018-10-09

## 2019-03-19 RX ORDER — AMOXICILLIN AND CLAVULANATE POTASSIUM 875; 125 MG/1; MG/1
875-125 TABLET, COATED ORAL
Qty: 14 | Refills: 0 | Status: DISCONTINUED | COMMUNITY
Start: 2019-01-09

## 2019-03-19 NOTE — PHYSICAL EXAM
[General Appearance - Alert] : alert [General Appearance - In No Acute Distress] : in no acute distress [Outer Ear] : the ears and nose were normal in appearance [Sclera] : the sclera and conjunctiva were normal [] : no respiratory distress [Neck Appearance] : the appearance of the neck was normal [Bowel Sounds] : normal bowel sounds [Abdomen Soft] : soft [Skin Color & Pigmentation] : normal skin color and pigmentation [Abnormal Walk] : normal gait [Oriented To Time, Place, And Person] : oriented to person, place, and time [No Focal Deficits] : no focal deficits [FreeTextEntry1] : LLQ tenderness

## 2019-03-19 NOTE — ASSESSMENT
[FreeTextEntry1] : 48 Y M, hx chronic fatigue and chronic pain (right side of body), plantar fasciitis, hypothyroidism, IBS-D, diverticulosis presents s/p cscope to evaluate colon s/p diverticulitis episode. Symptoms resolved after second antibiotic treatment with levaquin. Given read of intramural fistula, he was referred to our clinic to rule out Crohn's Disease. No EIMs or family history of CRC or IBD.\par \par Diverticulitis\par - resolved episode \par - reviewed cscope which revealed mild diverticulosis\par - discussed importance of avoiding constipation and eating high fiber foods\par - advised he call us if pain recurs \par \par IBS-D\par - start citrucel BID\par - referral to dietician to assist with FODMAP diet (already avoiding lactose and gluten, however may be sensitive to FODMAPS)\par - referral to IBS Specialist Dr. Lolita Nettles if above not effective \par - advised to improve stress management and continue exercising \par - celiac panel negative \par \par F/U PRN

## 2019-03-19 NOTE — CONSULT LETTER
[Dear  ___] : Dear  [unfilled], [Please see my note below.] : Please see my note below. [Consult Letter:] : I had the pleasure of evaluating your patient, [unfilled]. [Consult Closing:] : Thank you very much for allowing me to participate in the care of this patient.  If you have any questions, please do not hesitate to contact me. [Sincerely,] : Sincerely, [FreeTextEntry3] : Leona Duran NP \par [facility], [facility]\par \par \par

## 2019-03-19 NOTE — HISTORY OF PRESENT ILLNESS
[de-identified] : 48 Y M, hx chronic fatigue and chronic pain (right side of body), plantar fasciitis, hypothyroidism, IBS-D,  and diverticulosis, presents today s/p cscope to evaluate diverticulitis episode.\par \par Cscope: TI had scattered punctate spots of unclear significance, otherwise WNL. mild diverticulosis in sigmoid colon. PATH - all unremarkable\par \par HPI: Pt reports 1/3/19 he began having abdominal pain. Underwent outpatient CT scan which revealed circumferential bowel wall thickening throughout the sigmoid colon, a/w pericolonic stranding and perforation in area of diverticula. He was given Augmentin for 1 week with improvement but not resolution of symptoms as after he stopped medication, he had pain again. No fevers/chills or nausea/vomiting. \par Reports he's had history of chronic loose stools since his 20s. Underwent EGD and cscope in 2013 which revealed gastritis, otherwise normal. No biopsies available. He reports he stopped gluten and lactose years ago with improvement in bloating but not loose stools.\par Reports BMs 3-5x/day that are alternating. No bloody bowel movements, never has had. No fissures, perianal fistula or abscess. Lost weight with most recent pain episode, has not gained back the 10lbs due to low appetite. \par \par Has been on low dose antidepressant which did not help his stools. Does not like imodium - constipates him too much. Has been on benefiber which didn't help (contains gluten). 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 Symptoms of loose stool did not improve with treatment. \par \par No EIMs.\par \par \par Social hx: no alcohol use, no tobacco use, no illicit drug use, NSAID use rarely\par Family hx: denies CRC or IBD

## 2019-11-11 NOTE — ED PROVIDER NOTE - DISCUSSED CLINICAL AND RADIOLOGICAL FINDINGS WITH, MDM
-- Message is from the [facility]--    Called and left voicemail to schedule post hospital follow appointment.     Hospital:  [facility]   Discharge date:  11/4/19  Appointment needed with PCP by: 11/14/19     ACC AGENT: If patient calls back, follow Guided Workflow to schedule the PHF appointment.    Escalate to leadership if unable to schedule an appointment within that timeframe.    
patient

## 2021-01-01 NOTE — HISTORY OF PRESENT ILLNESS
[de-identified] : 48 Y M, hx chronic fatigue and chronic pain (right side of body), plantar fasciitis, hypothyroidism, IBS-D, presents today post-ED visit for sigmoid abscess. \par \par Pt reports 1/3/19 he began having abdominal pain. Underwent outpatient CT scan which revealed circumferential bowel wall thickening throughout the sigmoid colon, a/w pericolonic stranding and perforation in area of diverticula. He was given Augmentin for 1 week with improvement but not resolution of symptoms as after he stopped medication, he had pain again. No fevers/chills or nausea/vomiting. \par Reports he's had history of chronic loose stools since his 20s. Underwent EGD and cscope in 2013 which revealed gastritis, otherwise normal. No biopsies available. He reports he stopped gluten and lactose years ago with improvement in bloating but not loose stools.\par Reports BMs 3-5x/day that are alternating. No bloody bowel movements, never has had. No fissures, perianal fistula or abscess. Lost weight with most recent pain episode, has not gained back the 10lbs due to low appetite. 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 Symptoms of loose stool did not improve with treatment. \par \par No EIMs.\par \par \par Social hx: no alcohol use, no tobacco use, no illicit drug use, NSAID use rarely\par Family hx: denies CRC or IBD  2
